# Patient Record
Sex: MALE | URBAN - METROPOLITAN AREA
[De-identification: names, ages, dates, MRNs, and addresses within clinical notes are randomized per-mention and may not be internally consistent; named-entity substitution may affect disease eponyms.]

---

## 2023-06-04 ENCOUNTER — APPOINTMENT (RX ONLY)
Dept: URBAN - METROPOLITAN AREA CLINIC 147 | Facility: CLINIC | Age: 63
Setting detail: DERMATOLOGY
End: 2023-06-04

## 2023-06-04 DIAGNOSIS — L30.9 DERMATITIS, UNSPECIFIED: ICD-10-CM | Status: INADEQUATELY CONTROLLED

## 2023-06-04 PROCEDURE — ? COUNSELING

## 2023-06-04 PROCEDURE — ? TREATMENT REGIMEN

## 2023-06-04 PROCEDURE — 99204 OFFICE O/P NEW MOD 45 MIN: CPT

## 2023-06-04 PROCEDURE — ? PRESCRIPTION

## 2023-06-04 RX ORDER — TERBINAFINE HYDROCHLORIDE 250 MG/1
TABLET ORAL
Qty: 28 | Refills: 0 | Status: ERX | COMMUNITY
Start: 2023-06-04

## 2023-06-04 RX ADMIN — TERBINAFINE HYDROCHLORIDE: 250 TABLET ORAL at 00:00

## 2023-06-04 ASSESSMENT — LOCATION SIMPLE DESCRIPTION DERM: LOCATION SIMPLE: SCROTUM

## 2023-06-04 ASSESSMENT — LOCATION DETAILED DESCRIPTION DERM: LOCATION DETAILED: RIGHT ANTERIOR SCROTUM

## 2023-06-04 ASSESSMENT — LOCATION ZONE DERM: LOCATION ZONE: GENITALIA

## 2023-06-04 NOTE — PROCEDURE: TREATMENT REGIMEN
Initiate Treatment: terbinafine 250 mg daily for 4 weeks
Plan: Discussed possible diagnoses including contact dermatitis, atopic dermatitis, and psoriasis due to recurrent nature. Patient concerned about fungal infection and notes that antifungal spray is only thing that improves itch. Declined biopsy. Will rule out fungal infection with 4-week course of terbinafine. If not significantly improved or resolved at follow up, will reconsider biopsy and/or trial anti-inflammatory
Detail Level: Simple

## 2023-06-04 NOTE — HPI: RASH
What Type Of Note Output Would You Prefer (Optional)?: Standard Output
Is The Patient Presenting As Previously Scheduled?: Yes
How Severe Is Your Rash?: mild
Is This A New Presentation, Or A Follow-Up?: Rash
Additional History: He was seen by urgent care 3 weeks ago they prescribed him ketoconazole, fluconazole and prednisone

## 2023-07-09 ENCOUNTER — APPOINTMENT (RX ONLY)
Dept: URBAN - METROPOLITAN AREA CLINIC 147 | Facility: CLINIC | Age: 63
Setting detail: DERMATOLOGY
End: 2023-07-09

## 2023-07-09 DIAGNOSIS — L30.9 DERMATITIS, UNSPECIFIED: ICD-10-CM

## 2023-07-09 PROCEDURE — ? TREATMENT REGIMEN

## 2023-07-09 PROCEDURE — ? PRESCRIPTION

## 2023-07-09 PROCEDURE — 99214 OFFICE O/P EST MOD 30 MIN: CPT

## 2023-07-09 PROCEDURE — ? COUNSELING

## 2023-07-09 RX ORDER — TAPINAROF 10 MG/1000MG
CREAM TOPICAL
Qty: 60 | Refills: 6 | Status: ERX | COMMUNITY
Start: 2023-07-09

## 2023-07-09 RX ADMIN — TAPINAROF: 10 CREAM TOPICAL at 00:00

## 2023-07-09 ASSESSMENT — LOCATION DETAILED DESCRIPTION DERM
LOCATION DETAILED: RIGHT ANTERIOR SCROTUM
LOCATION DETAILED: LEFT ANTERIOR SCROTUM

## 2023-07-09 ASSESSMENT — LOCATION SIMPLE DESCRIPTION DERM: LOCATION SIMPLE: SCROTUM

## 2023-07-09 ASSESSMENT — LOCATION ZONE DERM: LOCATION ZONE: GENITALIA

## 2023-07-09 NOTE — PROCEDURE: TREATMENT REGIMEN
Discontinue Regimen: terbinafine
Initiate Treatment: Vtama 1 % topical cream QD
Plan: Patient gets relief from antifungal spray. Educated patient that history, persistence, and partial response to terbinafine do not support tinea cruris or candidiasis diagnoses. Suspect psoriasis. Discussed atopic and contact differentials but patient does not have significant atopy and previously tried eliminating potential exposures. Recommended biopsy. Patient declines at this time. Will trial Vtama as remittive effect expected to reduce dependence on frequent application of topical
Detail Level: Simple